# Patient Record
Sex: MALE | Race: AMERICAN INDIAN OR ALASKA NATIVE | URBAN - METROPOLITAN AREA
[De-identification: names, ages, dates, MRNs, and addresses within clinical notes are randomized per-mention and may not be internally consistent; named-entity substitution may affect disease eponyms.]

---

## 2018-01-10 ENCOUNTER — OUTPATIENT (OUTPATIENT)
Dept: OUTPATIENT SERVICES | Facility: HOSPITAL | Age: 68
LOS: 1 days | Discharge: HOME | End: 2018-01-10

## 2018-01-10 DIAGNOSIS — D64.9 ANEMIA, UNSPECIFIED: ICD-10-CM

## 2018-01-10 DIAGNOSIS — E78.4 OTHER HYPERLIPIDEMIA: ICD-10-CM

## 2018-01-10 DIAGNOSIS — N39.0 URINARY TRACT INFECTION, SITE NOT SPECIFIED: ICD-10-CM

## 2018-01-10 DIAGNOSIS — K75.9 INFLAMMATORY LIVER DISEASE, UNSPECIFIED: ICD-10-CM

## 2018-11-27 ENCOUNTER — OUTPATIENT (OUTPATIENT)
Dept: OUTPATIENT SERVICES | Facility: HOSPITAL | Age: 68
LOS: 1 days | Discharge: HOME | End: 2018-11-27

## 2018-11-27 DIAGNOSIS — E83.40 DISORDERS OF MAGNESIUM METABOLISM, UNSPECIFIED: ICD-10-CM

## 2018-11-27 DIAGNOSIS — N39.0 URINARY TRACT INFECTION, SITE NOT SPECIFIED: ICD-10-CM

## 2018-11-27 DIAGNOSIS — D64.9 ANEMIA, UNSPECIFIED: ICD-10-CM

## 2018-11-27 DIAGNOSIS — E78.00 PURE HYPERCHOLESTEROLEMIA, UNSPECIFIED: ICD-10-CM

## 2019-01-16 PROBLEM — Z00.00 ENCOUNTER FOR PREVENTIVE HEALTH EXAMINATION: Status: ACTIVE | Noted: 2019-01-16

## 2019-01-22 ENCOUNTER — APPOINTMENT (OUTPATIENT)
Dept: CARDIOLOGY | Facility: CLINIC | Age: 69
End: 2019-01-22

## 2019-02-01 ENCOUNTER — APPOINTMENT (OUTPATIENT)
Dept: CARDIOLOGY | Facility: CLINIC | Age: 69
End: 2019-02-01
Payer: COMMERCIAL

## 2019-02-01 VITALS
WEIGHT: 131 LBS | DIASTOLIC BLOOD PRESSURE: 80 MMHG | HEIGHT: 62 IN | SYSTOLIC BLOOD PRESSURE: 124 MMHG | BODY MASS INDEX: 24.11 KG/M2

## 2019-02-01 PROCEDURE — 99214 OFFICE O/P EST MOD 30 MIN: CPT

## 2019-02-01 NOTE — HISTORY OF PRESENT ILLNESS
[FreeTextEntry1] : pt had hx of ventricular arrhytmia and had aicd 20yrs ago doing well \par  no dizzyness \par  no chest pain \par  no palpitations\par  meds reviewed

## 2019-04-03 ENCOUNTER — MEDICATION RENEWAL (OUTPATIENT)
Age: 69
End: 2019-04-03

## 2019-05-09 ENCOUNTER — APPOINTMENT (OUTPATIENT)
Dept: CARDIOLOGY | Facility: CLINIC | Age: 69
End: 2019-05-09
Payer: MEDICARE

## 2019-05-09 VITALS
WEIGHT: 129 LBS | HEIGHT: 62 IN | DIASTOLIC BLOOD PRESSURE: 70 MMHG | BODY MASS INDEX: 23.74 KG/M2 | SYSTOLIC BLOOD PRESSURE: 122 MMHG

## 2019-05-09 PROCEDURE — 99213 OFFICE O/P EST LOW 20 MIN: CPT

## 2019-05-09 PROCEDURE — 93000 ELECTROCARDIOGRAM COMPLETE: CPT

## 2019-05-09 NOTE — REASON FOR VISIT
[Follow-Up - Clinic] : a clinic follow-up of [Abnormal ECG] : an abnormal ECG [Hypertension] : hypertension [Hyperlipidemia] : hyperlipidemia

## 2019-05-09 NOTE — HISTORY OF PRESENT ILLNESS
[FreeTextEntry1] : pt is feeling well\par  no chest pains\par  no palpitations\par  s/p aicd for ventricular arrhythmia\par meds reviewed

## 2019-05-09 NOTE — PHYSICAL EXAM
[General Appearance - Well Developed] : well developed [Normal Appearance] : normal appearance [Well Groomed] : well groomed [General Appearance - Well Nourished] : well nourished [No Deformities] : no deformities [General Appearance - In No Acute Distress] : no acute distress [Arterial Pulses Normal] : the arterial pulses were normal [Heart Sounds] : normal S1 and S2 [Heart Rate And Rhythm] : heart rate and rhythm were normal [Veins - Varicosity Changes] : no varicosital changes were noted in the lower extremities [Edema] : no peripheral edema present

## 2019-05-09 NOTE — ASSESSMENT
[FreeTextEntry1] :  pt is feeling well \par  bp well controlled 116/80\par  meds rnewed\par  no arrhythmias noted\par  e k g shows nsr 1 st degree a v block\par  no acute st t changes seen

## 2019-10-29 ENCOUNTER — APPOINTMENT (OUTPATIENT)
Dept: CARDIOLOGY | Facility: CLINIC | Age: 69
End: 2019-10-29
Payer: MEDICARE

## 2019-10-29 VITALS
BODY MASS INDEX: 24.92 KG/M2 | WEIGHT: 132 LBS | HEIGHT: 61 IN | SYSTOLIC BLOOD PRESSURE: 122 MMHG | DIASTOLIC BLOOD PRESSURE: 84 MMHG

## 2019-10-29 PROCEDURE — 99213 OFFICE O/P EST LOW 20 MIN: CPT

## 2019-10-29 PROCEDURE — 93000 ELECTROCARDIOGRAM COMPLETE: CPT

## 2019-10-29 NOTE — HISTORY OF PRESENT ILLNESS
[FreeTextEntry1] : aicd checked by DR Rodney in 8/19 functioning well\par  bno chest pains \par  no dyspnoea\par  meds reviewed

## 2019-10-29 NOTE — ASSESSMENT
[FreeTextEntry1] : pt is feeling well\par  cardiac ststus is stable\par  no arrhythmias noted\par  e k g nsr 1 degree a v block

## 2020-01-09 ENCOUNTER — RX RENEWAL (OUTPATIENT)
Age: 70
End: 2020-01-09

## 2020-01-10 ENCOUNTER — MEDICATION RENEWAL (OUTPATIENT)
Age: 70
End: 2020-01-10

## 2020-04-09 ENCOUNTER — APPOINTMENT (OUTPATIENT)
Dept: CARDIOLOGY | Facility: CLINIC | Age: 70
End: 2020-04-09
Payer: MEDICARE

## 2020-04-09 PROCEDURE — 99442: CPT

## 2020-04-09 NOTE — HISTORY OF PRESENT ILLNESS
[FreeTextEntry1] : pt called aggreing for telephone visit as office closed  due to Covid 19\par  pt is feeling well , no chest pains., palpitations\par  pt denies any exertional dypnoea\par  pt s/p aicd for ventricular arrhythmia ,denies any presyncopal attacks\par  meds reviewed with pt\par  pt just renewed all of his meds

## 2020-04-09 NOTE — ASSESSMENT
[FreeTextEntry1] : pt is doing well\par  no specific complaints\par  meds reviewed with pt no side effects\par  pt is to get blood work advise he can wait for 3 more weeks\par  aicd to be evaluated by DR Rodney next 3 weeks\par  no chf and arrhythmia sx

## 2020-08-28 ENCOUNTER — APPOINTMENT (OUTPATIENT)
Dept: CARDIOLOGY | Facility: CLINIC | Age: 70
End: 2020-08-28
Payer: MEDICARE

## 2020-08-28 VITALS
WEIGHT: 131 LBS | DIASTOLIC BLOOD PRESSURE: 70 MMHG | BODY MASS INDEX: 24.75 KG/M2 | SYSTOLIC BLOOD PRESSURE: 123 MMHG | TEMPERATURE: 97.2 F

## 2020-08-28 DIAGNOSIS — I49.9 CARDIAC ARRHYTHMIA, UNSPECIFIED: ICD-10-CM

## 2020-08-28 PROCEDURE — 93000 ELECTROCARDIOGRAM COMPLETE: CPT

## 2020-08-28 NOTE — PHYSICAL EXAM
[General Appearance - Well Developed] : well developed [Normal Appearance] : normal appearance [Well Groomed] : well groomed [General Appearance - Well Nourished] : well nourished [General Appearance - In No Acute Distress] : no acute distress [No Deformities] : no deformities [Heart Sounds] : normal S1 and S2 [Heart Rate And Rhythm] : heart rate and rhythm were normal [Murmurs] : no murmurs present

## 2020-08-28 NOTE — HISTORY OF PRESENT ILLNESS
[FreeTextEntry1] : pt is feeling well\par  no chest pains or palpitations\par  meds reviewed\par  pt is seen by berlin singh blood work was wnl\par  s/p aicd seen by DR Rodney\par

## 2020-08-28 NOTE — ASSESSMENT
[FreeTextEntry1] :   e kg nsr no arrhythmia noted \par  no st t changes noted\par  htn well controllwed 120/80\par  cardiac status is stable no chf\par  blood work from8/7/20 cbc cmp and lipids wnl\par  meds renewed

## 2020-12-04 ENCOUNTER — APPOINTMENT (OUTPATIENT)
Dept: CARDIOLOGY | Facility: CLINIC | Age: 70
End: 2020-12-04

## 2020-12-21 ENCOUNTER — RX RENEWAL (OUTPATIENT)
Age: 70
End: 2020-12-21

## 2021-01-27 ENCOUNTER — FORM ENCOUNTER (OUTPATIENT)
Age: 71
End: 2021-01-27

## 2021-02-02 ENCOUNTER — APPOINTMENT (OUTPATIENT)
Dept: CARDIOLOGY | Facility: CLINIC | Age: 71
End: 2021-02-02

## 2021-04-13 ENCOUNTER — FORM ENCOUNTER (OUTPATIENT)
Age: 71
End: 2021-04-13

## 2021-06-28 ENCOUNTER — FORM ENCOUNTER (OUTPATIENT)
Age: 71
End: 2021-06-28

## 2021-07-11 ENCOUNTER — FORM ENCOUNTER (OUTPATIENT)
Age: 71
End: 2021-07-11

## 2021-09-19 ENCOUNTER — FORM ENCOUNTER (OUTPATIENT)
Age: 71
End: 2021-09-19

## 2021-10-03 ENCOUNTER — FORM ENCOUNTER (OUTPATIENT)
Age: 71
End: 2021-10-03

## 2021-11-03 ENCOUNTER — FORM ENCOUNTER (OUTPATIENT)
Age: 71
End: 2021-11-03

## 2022-01-12 ENCOUNTER — FORM ENCOUNTER (OUTPATIENT)
Age: 72
End: 2022-01-12

## 2023-02-03 ENCOUNTER — APPOINTMENT (OUTPATIENT)
Dept: CARDIOLOGY | Facility: CLINIC | Age: 73
End: 2023-02-03
Payer: MEDICARE

## 2023-02-03 VITALS
BODY MASS INDEX: 23.79 KG/M2 | OXYGEN SATURATION: 100 % | SYSTOLIC BLOOD PRESSURE: 164 MMHG | WEIGHT: 126 LBS | HEIGHT: 61 IN | HEART RATE: 54 BPM | DIASTOLIC BLOOD PRESSURE: 91 MMHG

## 2023-02-03 DIAGNOSIS — E03.9 HYPOTHYROIDISM, UNSPECIFIED: ICD-10-CM

## 2023-02-03 PROCEDURE — 93880 EXTRACRANIAL BILAT STUDY: CPT

## 2023-02-03 PROCEDURE — 93306 TTE W/DOPPLER COMPLETE: CPT

## 2023-02-03 PROCEDURE — 93000 ELECTROCARDIOGRAM COMPLETE: CPT

## 2023-02-03 PROCEDURE — 99214 OFFICE O/P EST MOD 30 MIN: CPT | Mod: 25

## 2023-02-03 RX ORDER — LEVOTHYROXINE SODIUM 25 UG/1
25 CAPSULE ORAL DAILY
Refills: 0 | Status: ACTIVE | COMMUNITY
Start: 2023-02-03

## 2023-02-03 NOTE — DISCUSSION/SUMMARY
[EKG obtained to assist in diagnosis and management of assessed problem(s)] : EKG obtained to assist in diagnosis and management of assessed problem(s) [FreeTextEntry1] : Recommend an echocardiogram to evaluate LV function. Echocardiogram performed today revealed normal LV function. \par \par Bruit: Recommend a carotid duplex to evaluate bruit. Carotid duplex performed today revealed mild plaque bilaterally. \par \par SOB on Exertion: Due to hx of VT (on Flecainide) and exertional nature of symptoms, recommend a stress echocardiogram to rule out ischemic heart disease equivalent. \par \par HTN: The impression is hypertension. There are no changes in medication management. Continue current medical therapy. Other planned treatments include an exercise regimen, weight loss, low sodium diet, and alcohol moderation.\par \par HLD: The impression is hyperlipidemia. Currently, the condition is stable. There are no changes in medication management. Continue current medical therapy. Other planned treatment includes diet modification, exercise, and weight loss.\par \par Instructed to follow up after testing is complete. \par Plan was discussed with the patient.

## 2023-02-03 NOTE — PHYSICAL EXAM
[Well Developed] : well developed [Well Nourished] : well nourished [No Acute Distress] : no acute distress [Normal Conjunctiva] : normal conjunctiva [Normal Venous Pressure] : normal venous pressure [Normal S1, S2] : normal S1, S2 [No Murmur] : no murmur [No Rub] : no rub [No Gallop] : no gallop [Clear Lung Fields] : clear lung fields [Good Air Entry] : good air entry [No Respiratory Distress] : no respiratory distress  [Soft] : abdomen soft [Non Tender] : non-tender [No Masses/organomegaly] : no masses/organomegaly [Normal Bowel Sounds] : normal bowel sounds [Normal Gait] : normal gait [No Edema] : no edema [No Cyanosis] : no cyanosis [No Clubbing] : no clubbing [No Varicosities] : no varicosities [No Rash] : no rash [No Skin Lesions] : no skin lesions [Moves all extremities] : moves all extremities [No Focal Deficits] : no focal deficits [Normal Speech] : normal speech [Alert and Oriented] : alert and oriented [Normal memory] : normal memory [de-identified] : (+) Carotid Bruit

## 2023-02-03 NOTE — CARDIOLOGY SUMMARY
[de-identified] : 02/03/2023: SR, (-) ST-T wave changes.  [de-identified] : 02/03/2023: Normal LV function.  [de-identified] : Carotid Duplex: Mild plaque bilaterally.

## 2023-02-03 NOTE — HISTORY OF PRESENT ILLNESS
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 BP has been mildly elevated today (170 systolic). States he had [place] pickle last night along with Ibuprofen this morning. Additionally, patient complains of SOB with exertion, which is relieved with rest. Otherwise: (-) chest pain, (-) cough, (-) hemoptysis, (-) leg swelling/pain, (-) recent travel, (-) prolonged immobility.\par

## 2023-02-23 ENCOUNTER — APPOINTMENT (OUTPATIENT)
Dept: CARDIOLOGY | Facility: CLINIC | Age: 73
End: 2023-02-23
Payer: MEDICARE

## 2023-02-23 DIAGNOSIS — Z95.810 PRESENCE OF AUTOMATIC (IMPLANTABLE) CARDIAC DEFIBRILLATOR: ICD-10-CM

## 2023-02-23 PROCEDURE — 99214 OFFICE O/P EST MOD 30 MIN: CPT

## 2023-02-23 PROCEDURE — 93320 DOPPLER ECHO COMPLETE: CPT

## 2023-02-23 PROCEDURE — 93325 DOPPLER ECHO COLOR FLOW MAPG: CPT

## 2023-02-23 PROCEDURE — 93351 STRESS TTE COMPLETE: CPT

## 2023-02-23 NOTE — PHYSICAL EXAM
[Well Developed] : well developed [Well Nourished] : well nourished [No Acute Distress] : no acute distress [Normal Conjunctiva] : normal conjunctiva [Normal Venous Pressure] : normal venous pressure [Normal S1, S2] : normal S1, S2 [No Murmur] : no murmur [No Rub] : no rub [No Gallop] : no gallop [Clear Lung Fields] : clear lung fields [Good Air Entry] : good air entry [No Respiratory Distress] : no respiratory distress  [Soft] : abdomen soft [Non Tender] : non-tender [No Masses/organomegaly] : no masses/organomegaly [Normal Bowel Sounds] : normal bowel sounds [Normal Gait] : normal gait [No Edema] : no edema [No Cyanosis] : no cyanosis [No Clubbing] : no clubbing [No Varicosities] : no varicosities [No Rash] : no rash [No Skin Lesions] : no skin lesions [Moves all extremities] : moves all extremities [No Focal Deficits] : no focal deficits [Normal Speech] : normal speech [Alert and Oriented] : alert and oriented [Normal memory] : normal memory [de-identified] : (+) Carotid Bruit

## 2023-02-23 NOTE — DISCUSSION/SUMMARY
[FreeTextEntry1] : Stress echo performed today revealed no evidence of exercise-induced ischemia at 85% MPHR.\par \par HTN: The impression is hypertension. There are no changes in medication management. Continue current medical therapy. Other planned treatments include an exercise regimen, weight loss, low sodium diet, and alcohol moderation.\par \par HLD: The impression is hyperlipidemia. Currently, the condition is stable. There are no changes in medication management. Continue current medical therapy. Other planned treatment includes diet modification, exercise, and weight loss.\par \par Followup in 6m or sooner if needed.\par \par Plan was discussed with the patient.

## 2023-02-23 NOTE — HISTORY OF PRESENT ILLNESS
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  Patient was last seen in office on 2/3/23 with complaints of SOB with exertion, which is relieved with rest. Otherwise: (-) chest pain, (-) cough, (-) hemoptysis, (-) leg swelling/pain, (-) recent travel, (-) prolonged immobility.\par

## 2023-02-23 NOTE — CARDIOLOGY SUMMARY
[de-identified] : 02/03/2023: SR, (-) ST-T wave changes.  [de-identified] : 02/03/2023: Normal LV function.  [de-identified] : Carotid Duplex: Mild plaque bilaterally.

## 2023-03-13 ENCOUNTER — RX RENEWAL (OUTPATIENT)
Age: 73
End: 2023-03-13

## 2023-09-06 RX ORDER — METOPROLOL TARTRATE 50 MG/1
50 TABLET, FILM COATED ORAL
Qty: 270 | Refills: 3 | Status: ACTIVE | COMMUNITY
Start: 1900-01-01 | End: 1900-01-01

## 2023-09-06 RX ORDER — FLECAINIDE ACETATE 50 MG/1
50 TABLET ORAL TWICE DAILY
Qty: 180 | Refills: 3 | Status: ACTIVE | COMMUNITY
Start: 2020-09-22 | End: 1900-01-01

## 2023-09-06 RX ORDER — SIMVASTATIN 20 MG/1
20 TABLET, FILM COATED ORAL
Qty: 90 | Refills: 3 | Status: ACTIVE | COMMUNITY
Start: 2020-09-22 | End: 1900-01-01

## 2024-01-09 ENCOUNTER — APPOINTMENT (OUTPATIENT)
Dept: CARDIOLOGY | Facility: CLINIC | Age: 74
End: 2024-01-09
Payer: MEDICARE

## 2024-01-09 VITALS
WEIGHT: 128 LBS | SYSTOLIC BLOOD PRESSURE: 146 MMHG | OXYGEN SATURATION: 100 % | DIASTOLIC BLOOD PRESSURE: 74 MMHG | HEIGHT: 61 IN | HEART RATE: 56 BPM | BODY MASS INDEX: 24.17 KG/M2

## 2024-01-09 DIAGNOSIS — R09.89 OTHER SPECIFIED SYMPTOMS AND SIGNS INVOLVING THE CIRCULATORY AND RESPIRATORY SYSTEMS: ICD-10-CM

## 2024-01-09 DIAGNOSIS — R06.02 SHORTNESS OF BREATH: ICD-10-CM

## 2024-01-09 DIAGNOSIS — R01.1 CARDIAC MURMUR, UNSPECIFIED: ICD-10-CM

## 2024-01-09 PROCEDURE — 93880 EXTRACRANIAL BILAT STUDY: CPT

## 2024-01-09 PROCEDURE — 93306 TTE W/DOPPLER COMPLETE: CPT

## 2024-01-09 PROCEDURE — 99214 OFFICE O/P EST MOD 30 MIN: CPT | Mod: 25

## 2024-01-09 PROCEDURE — 93000 ELECTROCARDIOGRAM COMPLETE: CPT

## 2024-01-10 NOTE — DISCUSSION/SUMMARY
[EKG obtained to assist in diagnosis and management of assessed problem(s)] : EKG obtained to assist in diagnosis and management of assessed problem(s) [FreeTextEntry1] : Murmur: Recommend an echocardiogram to evaluate for LV function. Echocardiogram performed today revealed normal LV function.  Bruit: Recommend a carotid duplex to evaluate bruit. Carotid duplex performed today revealed no evidence of hemodynamically significant stenosis.  SOB on Exertion: Given history of Flecainide use and prolonged QT, recommend a stress echocardiogram to evaluate for ischemic equivalent to rule out ischemic cardiovascular events.   HTN: The impression is hypertension. Currently, the condition is controlled. There are no changes in medication management. Continue current medical therapy. Other planned treatments include an exercise regimen, weight loss, low sodium diet, and alcohol moderation.  HLD: The impression is hyperlipidemia. Currently, the condition is stable. There are no changes in medication management. Continue current medical therapy. Other planned treatment includes diet modification, exercise, and weight loss.  Instructed to follow up after testing is complete.  Plan was discussed with the patient.

## 2024-01-10 NOTE — HISTORY OF PRESENT ILLNESS
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 Endorses SOB with exertion, which is relieved with rest. Denies any other complaints. Otherwise: (-) chest pain, (-) cough, (-) hemoptysis, (-) leg swelling/pain, (-) recent travel, (-) prolonged immobility.   Labs from 11/22/2023 reviewed:  Total Cholesterol: 169 HDL: 66 Triglycerides: 121 LDL: 81 Chol/HDLC Ratio: 2.6 Non-HDL-C: 103

## 2024-01-10 NOTE — PHYSICAL EXAM
[Well Developed] : well developed [Well Nourished] : well nourished [No Acute Distress] : no acute distress [Normal Conjunctiva] : normal conjunctiva [Normal Venous Pressure] : normal venous pressure [Normal S1, S2] : normal S1, S2 [No Rub] : no rub [No Gallop] : no gallop [II] : a grade 2 [Clear Lung Fields] : clear lung fields [Good Air Entry] : good air entry [No Respiratory Distress] : no respiratory distress  [Soft] : abdomen soft [Non Tender] : non-tender [No Masses/organomegaly] : no masses/organomegaly [Normal Bowel Sounds] : normal bowel sounds [Normal Gait] : normal gait [No Edema] : no edema [No Cyanosis] : no cyanosis [No Clubbing] : no clubbing [No Varicosities] : no varicosities [No Rash] : no rash [No Skin Lesions] : no skin lesions [Moves all extremities] : moves all extremities [No Focal Deficits] : no focal deficits [Normal Speech] : normal speech [Alert and Oriented] : alert and oriented [Normal memory] : normal memory [de-identified] : (+) Carotid Bruit

## 2024-01-10 NOTE — CARDIOLOGY SUMMARY
[de-identified] : 01/09/2024: SB @ 53 BPM. ======================================================= [de-identified] : Exercise Stress Echo - 02/23/2023: CONCLUSIONS: 1. Treadmill exercise Stress Echo Test. 2. Normal stress echocardiogram with normal augmentation of left ventricular systolic function. 3. The patient underwent stress testing using the Standard [place] protocol. _ The patient exercised for 10 min 1 sec. Test was stopped due to fatigue.  4. No evidence of induced ischemia. Normal electrocardiography. No ECG evidence of ischemia at or near maximal predicted heart rate. No induced arrhythmia. 5. Baseline left ventricular cavity size was normal. Immediate post-stress, the left ventricular cavity size was normal. 6. Resting baseline LV ejection fraction was estimated at approximately 60 to 65% (normal EF). Immediate post-stress the LV ejection fraction was estimated at approximately 70 to 75% (normal EF). 7. Excellent functional capacity. ======================================================= [de-identified] : TTE - 02/03/2023: CONCLUSIONS: 1. Left ventricular systolic function is normal with a calculated ejection fraction of 58 % by Shahid's biplane method of discs. 2. Mild mitral regurgitation. 3. No pericardial effusion seen. 4. Device wire is visualized in the right ventricle. 5. Mild aortic regurgitation. 6. Mild-moderate tricuspid regurgitation. 7. Normal mitral valve structure and function. No mitral stenosis or significant regurgitation. 8. Right ventricular systolic function is normal. 9. The pulmonary valve is normal in structure and function, with good leaflet excursion, and without any evidence of pulmonary stenosis or significant regurgitation. 10. Tricuspid valve is structurally normal with no stenosis or significant regurgitation. ======================================================= [de-identified] : Carotid Doppler - 02/03/2023: CONCLUSIONS: 1. Right: proximal ICA no stenosis. 2. Left: proximal ICA 1-19% stenosis. 3. Vertebral: antegrade flow bilaterally. 4. Subclavian arteries: no significant atherosclerosis bilaterally. =======================================================

## 2024-01-31 ENCOUNTER — APPOINTMENT (OUTPATIENT)
Dept: CARDIOLOGY | Facility: CLINIC | Age: 74
End: 2024-01-31
Payer: MEDICARE

## 2024-01-31 DIAGNOSIS — R94.31 ABNORMAL ELECTROCARDIOGRAM [ECG] [EKG]: ICD-10-CM

## 2024-01-31 DIAGNOSIS — I10 ESSENTIAL (PRIMARY) HYPERTENSION: ICD-10-CM

## 2024-01-31 DIAGNOSIS — E78.2 MIXED HYPERLIPIDEMIA: ICD-10-CM

## 2024-01-31 PROCEDURE — 93320 DOPPLER ECHO COMPLETE: CPT

## 2024-01-31 PROCEDURE — 99214 OFFICE O/P EST MOD 30 MIN: CPT

## 2024-01-31 PROCEDURE — 93325 DOPPLER ECHO COLOR FLOW MAPG: CPT

## 2024-01-31 PROCEDURE — 93351 STRESS TTE COMPLETE: CPT

## 2024-01-31 NOTE — HISTORY OF PRESENT ILLNESS
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 Endorses SOB with exertion, which is relieved with rest. Denies any other complaints. Otherwise: (-) chest pain, (-) cough, (-) hemoptysis, (-) leg swelling/pain, (-) recent travel, (-) prolonged immobility.   Labs from 11/22/2023 reviewed:  Total Cholesterol: 169 HDL: 66 Triglycerides: 121 LDL: 81 Chol/HDLC Ratio: 2.6 Non-HDL-C: 103

## 2024-01-31 NOTE — PHYSICAL EXAM
[Well Developed] : well developed [Well Nourished] : well nourished [No Acute Distress] : no acute distress [Normal Conjunctiva] : normal conjunctiva [Normal Venous Pressure] : normal venous pressure [Normal S1, S2] : normal S1, S2 [No Rub] : no rub [No Gallop] : no gallop [II] : a grade 2 [Clear Lung Fields] : clear lung fields [Good Air Entry] : good air entry [No Respiratory Distress] : no respiratory distress  [Soft] : abdomen soft [Non Tender] : non-tender [No Masses/organomegaly] : no masses/organomegaly [Normal Bowel Sounds] : normal bowel sounds [Normal Gait] : normal gait [No Edema] : no edema [No Cyanosis] : no cyanosis [No Clubbing] : no clubbing [No Varicosities] : no varicosities [No Rash] : no rash [No Skin Lesions] : no skin lesions [Moves all extremities] : moves all extremities [No Focal Deficits] : no focal deficits [Normal Speech] : normal speech [Alert and Oriented] : alert and oriented [Normal memory] : normal memory [de-identified] : (+) Carotid Bruit

## 2024-01-31 NOTE — DISCUSSION/SUMMARY
[FreeTextEntry1] : Symptom-limited stress echo performed today revealed no evidence of exercise-induced ischemia at 78%.   HTN: The impression is hypertension. Currently, the condition is controlled. There are no changes in medication management. Continue current medical therapy. Other planned treatments include an exercise regimen, weight loss, low sodium diet, and alcohol moderation.  HLD: The impression is hyperlipidemia. Currently, the condition is stable. There are no changes in medication management. Continue current medical therapy. Other planned treatment includes diet modification, exercise, and weight loss.  Instructed to follow up in 6 months. Plan was discussed with the patient.

## 2024-01-31 NOTE — CARDIOLOGY SUMMARY
[de-identified] : 01/09/2024: SB @ 53 BPM. ======================================================= [de-identified] : Exercise Stress Echo - 02/23/2023: CONCLUSIONS: 1. Treadmill exercise Stress Echo Test. 2. Normal stress echocardiogram with normal augmentation of left ventricular systolic function. 3. The patient underwent stress testing using the Standard [place] protocol. _ The patient exercised for 10 min 1 sec. Test was stopped due to fatigue.  4. No evidence of induced ischemia. Normal electrocardiography. No ECG evidence of ischemia at or near maximal predicted heart rate. No induced arrhythmia. 5. Baseline left ventricular cavity size was normal. Immediate post-stress, the left ventricular cavity size was normal. 6. Resting baseline LV ejection fraction was estimated at approximately 60 to 65% (normal EF). Immediate post-stress the LV ejection fraction was estimated at approximately 70 to 75% (normal EF). 7. Excellent functional capacity. ======================================================= [de-identified] : TTE - 02/03/2023: CONCLUSIONS: 1. Left ventricular systolic function is normal with a calculated ejection fraction of 58 % by Shahid's biplane method of discs. 2. Mild mitral regurgitation. 3. No pericardial effusion seen. 4. Device wire is visualized in the right ventricle. 5. Mild aortic regurgitation. 6. Mild-moderate tricuspid regurgitation. 7. Normal mitral valve structure and function. No mitral stenosis or significant regurgitation. 8. Right ventricular systolic function is normal. 9. The pulmonary valve is normal in structure and function, with good leaflet excursion, and without any evidence of pulmonary stenosis or significant regurgitation. 10. Tricuspid valve is structurally normal with no stenosis or significant regurgitation. ======================================================= [de-identified] : Carotid Doppler - 02/03/2023: CONCLUSIONS: 1. Right: proximal ICA no stenosis. 2. Left: proximal ICA 1-19% stenosis. 3. Vertebral: antegrade flow bilaterally. 4. Subclavian arteries: no significant atherosclerosis bilaterally. =======================================================

## 2024-02-08 ENCOUNTER — OUTPATIENT (OUTPATIENT)
Dept: OUTPATIENT SERVICES | Facility: HOSPITAL | Age: 74
LOS: 1 days | Discharge: HOME | End: 2024-02-08
Payer: MEDICARE

## 2024-02-08 VITALS
WEIGHT: 126.1 LBS | RESPIRATION RATE: 17 BRPM | HEIGHT: 61 IN | SYSTOLIC BLOOD PRESSURE: 154 MMHG | OXYGEN SATURATION: 100 % | DIASTOLIC BLOOD PRESSURE: 72 MMHG | TEMPERATURE: 96 F | HEART RATE: 53 BPM

## 2024-02-08 VITALS — RESPIRATION RATE: 17 BRPM | HEART RATE: 58 BPM | SYSTOLIC BLOOD PRESSURE: 123 MMHG | DIASTOLIC BLOOD PRESSURE: 62 MMHG

## 2024-02-08 DIAGNOSIS — Z98.890 OTHER SPECIFIED POSTPROCEDURAL STATES: Chronic | ICD-10-CM

## 2024-02-08 DIAGNOSIS — H25.11 AGE-RELATED NUCLEAR CATARACT, RIGHT EYE: ICD-10-CM

## 2024-02-08 PROCEDURE — V2632: CPT

## 2024-02-08 PROCEDURE — 66999 UNLISTED PX ANT SEGMENT EYE: CPT

## 2024-02-08 NOTE — ASU PATIENT PROFILE, ADULT - FALL HARM RISK - UNIVERSAL INTERVENTIONS
Bed in lowest position, wheels locked, appropriate side rails in place/Call bell, personal items and telephone in reach/Instruct patient to call for assistance before getting out of bed or chair/Non-slip footwear when patient is out of bed/[place] to call system/Physically safe environment - no spills, clutter or unnecessary equipment/Purposeful Proactive Rounding/Room/bathroom lighting operational, light cord in reach

## 2024-02-08 NOTE — ASU DISCHARGE PLAN (ADULT/PEDIATRIC) - NS MD DC FALL RISK RISK
For information on Fall & Injury Prevention, visit: [email or web address] OR  [email or web address] OR  https://www.cdc.gov/steadi/patient.html

## 2024-02-08 NOTE — ASU PREOP CHECKLIST - IDENTIFICATION BAND VERIFIED
Patient is scheduled for a colonoscopy with Dr qureshi on 12/23. Please send Nulytely prep to patient's selected pharmacy.    Please place COVID order if not already placed and test is required    Thank you, GI Preadmit Surgery Scheduler 
done

## 2024-02-12 DIAGNOSIS — Z79.82 LONG TERM (CURRENT) USE OF ASPIRIN: ICD-10-CM

## 2024-02-12 DIAGNOSIS — H26.8 OTHER SPECIFIED CATARACT: ICD-10-CM

## 2024-02-12 DIAGNOSIS — Z87.891 PERSONAL HISTORY OF NICOTINE DEPENDENCE: ICD-10-CM

## 2024-02-12 DIAGNOSIS — I10 ESSENTIAL (PRIMARY) HYPERTENSION: ICD-10-CM

## 2024-02-12 DIAGNOSIS — Z95.810 PRESENCE OF AUTOMATIC (IMPLANTABLE) CARDIAC DEFIBRILLATOR: ICD-10-CM

## 2024-02-12 DIAGNOSIS — I25.10 ATHEROSCLEROTIC HEART DISEASE OF NATIVE CORONARY ARTERY WITHOUT ANGINA PECTORIS: ICD-10-CM

## 2024-02-12 DIAGNOSIS — E78.5 HYPERLIPIDEMIA, UNSPECIFIED: ICD-10-CM

## 2024-02-12 DIAGNOSIS — E03.9 HYPOTHYROIDISM, UNSPECIFIED: ICD-10-CM

## 2024-02-28 ENCOUNTER — RX RENEWAL (OUTPATIENT)
Age: 74
End: 2024-02-28

## 2024-02-28 RX ORDER — LOSARTAN POTASSIUM 50 MG/1
50 TABLET, FILM COATED ORAL
Qty: 90 | Refills: 3 | Status: ACTIVE | COMMUNITY
Start: 2020-12-21 | End: 1900-01-01

## 2024-08-23 ENCOUNTER — RX RENEWAL (OUTPATIENT)
Age: 74
End: 2024-08-23

## 2024-08-27 ENCOUNTER — RX RENEWAL (OUTPATIENT)
Age: 74
End: 2024-08-27

## 2024-10-16 ENCOUNTER — APPOINTMENT (OUTPATIENT)
Dept: CARDIOLOGY | Facility: CLINIC | Age: 74
End: 2024-10-16
Payer: MEDICARE

## 2024-10-16 ENCOUNTER — NON-APPOINTMENT (OUTPATIENT)
Age: 74
End: 2024-10-16

## 2024-10-16 VITALS
BODY MASS INDEX: 23.6 KG/M2 | OXYGEN SATURATION: 99 % | HEIGHT: 61 IN | HEART RATE: 56 BPM | WEIGHT: 125 LBS | DIASTOLIC BLOOD PRESSURE: 76 MMHG | SYSTOLIC BLOOD PRESSURE: 126 MMHG | RESPIRATION RATE: 16 BRPM

## 2024-10-16 DIAGNOSIS — Z95.810 PRESENCE OF AUTOMATIC (IMPLANTABLE) CARDIAC DEFIBRILLATOR: ICD-10-CM

## 2024-10-16 DIAGNOSIS — I10 ESSENTIAL (PRIMARY) HYPERTENSION: ICD-10-CM

## 2024-10-16 DIAGNOSIS — E78.2 MIXED HYPERLIPIDEMIA: ICD-10-CM

## 2024-10-16 PROCEDURE — 99214 OFFICE O/P EST MOD 30 MIN: CPT | Mod: 25

## 2024-10-16 PROCEDURE — 93000 ELECTROCARDIOGRAM COMPLETE: CPT

## 2025-02-19 ENCOUNTER — RX RENEWAL (OUTPATIENT)
Age: 75
End: 2025-02-19

## 2025-07-15 ENCOUNTER — NON-APPOINTMENT (OUTPATIENT)
Age: 75
End: 2025-07-15

## 2025-07-16 ENCOUNTER — APPOINTMENT (OUTPATIENT)
Dept: CARDIOLOGY | Facility: CLINIC | Age: 75
End: 2025-07-16
Payer: MEDICARE

## 2025-07-16 VITALS
HEART RATE: 63 BPM | HEIGHT: 61 IN | DIASTOLIC BLOOD PRESSURE: 76 MMHG | WEIGHT: 128 LBS | BODY MASS INDEX: 24.17 KG/M2 | OXYGEN SATURATION: 99 % | SYSTOLIC BLOOD PRESSURE: 140 MMHG

## 2025-07-16 PROBLEM — I45.81 CONGENITAL LONG QT SYNDROME: Status: ACTIVE | Noted: 2025-07-16

## 2025-07-16 PROBLEM — I47.20 VENTRICULAR TACHYCARDIA (PAROXYSMAL): Status: ACTIVE | Noted: 2025-07-16

## 2025-07-16 PROCEDURE — 93000 ELECTROCARDIOGRAM COMPLETE: CPT

## 2025-07-16 PROCEDURE — 99214 OFFICE O/P EST MOD 30 MIN: CPT

## 2025-07-16 PROCEDURE — G2211 COMPLEX E/M VISIT ADD ON: CPT

## 2025-08-14 ENCOUNTER — RX RENEWAL (OUTPATIENT)
Age: 75
End: 2025-08-14